# Patient Record
Sex: FEMALE | Race: WHITE | NOT HISPANIC OR LATINO | Employment: UNEMPLOYED | RURAL
[De-identification: names, ages, dates, MRNs, and addresses within clinical notes are randomized per-mention and may not be internally consistent; named-entity substitution may affect disease eponyms.]

---

## 2018-08-07 ENCOUNTER — HISTORICAL (OUTPATIENT)
Dept: ADMINISTRATIVE | Facility: HOSPITAL | Age: 48
End: 2018-08-07

## 2018-08-09 LAB
LAB AP CLINICAL INFORMATION: NORMAL
LAB AP GENERAL CAT - HISTORICAL: NORMAL
LAB AP INTERPRETATION/RESULT - HISTORICAL: NEGATIVE
LAB AP SPECIMEN ADEQUACY - HISTORICAL: NORMAL
LAB AP SPECIMEN SUBMITTED - HISTORICAL: NORMAL

## 2022-07-15 ENCOUNTER — OFFICE VISIT (OUTPATIENT)
Dept: FAMILY MEDICINE | Facility: CLINIC | Age: 52
End: 2022-07-15
Payer: OTHER GOVERNMENT

## 2022-07-15 VITALS
OXYGEN SATURATION: 100 % | DIASTOLIC BLOOD PRESSURE: 89 MMHG | HEIGHT: 65 IN | HEART RATE: 67 BPM | SYSTOLIC BLOOD PRESSURE: 138 MMHG | BODY MASS INDEX: 26.82 KG/M2 | WEIGHT: 161 LBS | TEMPERATURE: 99 F

## 2022-07-15 DIAGNOSIS — H92.03 OTALGIA OF BOTH EARS: Primary | ICD-10-CM

## 2022-07-15 PROCEDURE — 99203 OFFICE O/P NEW LOW 30 MIN: CPT | Mod: ,,, | Performed by: FAMILY MEDICINE

## 2022-07-15 PROCEDURE — 99203 PR OFFICE/OUTPT VISIT, NEW, LEVL III, 30-44 MIN: ICD-10-PCS | Mod: ,,, | Performed by: FAMILY MEDICINE

## 2022-07-15 RX ORDER — AMOXICILLIN AND CLAVULANATE POTASSIUM 875; 125 MG/1; MG/1
1 TABLET, FILM COATED ORAL EVERY 12 HOURS
Qty: 20 TABLET | Refills: 0 | Status: SHIPPED | OUTPATIENT
Start: 2022-07-15 | End: 2022-08-24 | Stop reason: SDUPTHER

## 2022-07-15 RX ORDER — ESCITALOPRAM OXALATE 20 MG/1
1 TABLET ORAL DAILY
COMMUNITY
Start: 2022-05-17 | End: 2022-08-24 | Stop reason: SDUPTHER

## 2022-07-15 RX ORDER — LOSARTAN POTASSIUM 50 MG/1
50 TABLET ORAL
COMMUNITY
End: 2022-08-24 | Stop reason: SDUPTHER

## 2022-07-15 RX ORDER — ROSUVASTATIN CALCIUM 20 MG/1
TABLET, COATED ORAL
COMMUNITY
Start: 2022-06-23 | End: 2022-08-24 | Stop reason: SDUPTHER

## 2022-07-15 RX ORDER — ROSUVASTATIN CALCIUM 20 MG/1
20 TABLET, COATED ORAL NIGHTLY
COMMUNITY
Start: 2022-06-26 | End: 2022-08-24 | Stop reason: SDUPTHER

## 2022-07-15 NOTE — PROGRESS NOTES
Subjective:       Patient ID: Yodit Jimenez is a 51 y.o. female.    Chief Complaint: Otalgia (Patient has bilateral ear pain X 3 weeks. ) and Sinus Problem (Patient has some sinus drainage X 3 weeks )    Patient says her ears were burning and swollen initially.  Now with pain inside her ear.  Some sinus congestion no fever.  No pain with chewing    Review of Systems      Objective:      Physical Exam  Constitutional:       General: She is not in acute distress.     Appearance: Normal appearance. She is not ill-appearing.   HENT:      Right Ear: Tympanic membrane, ear canal and external ear normal.      Left Ear: Tympanic membrane, ear canal and external ear normal.      Nose: Congestion and rhinorrhea present.      Mouth/Throat:      Pharynx: Posterior oropharyngeal erythema present.   Cardiovascular:      Rate and Rhythm: Normal rate and regular rhythm.   Pulmonary:      Effort: Pulmonary effort is normal.      Breath sounds: Normal breath sounds.   Musculoskeletal:         General: No tenderness (No TMJ tenderness).   Skin:     General: Skin is warm and dry.      Findings: No rash.   Neurological:      Mental Status: She is alert.         Assessment:       Problem List Items Addressed This Visit    None         Plan:         no explanation for her ear discomfort found lung exam.  Unusual complaint.  Treat with antibiotics.  She does not tolerate steroids.  If symptoms continue cough or referral to ENT

## 2022-07-23 ENCOUNTER — OFFICE VISIT (OUTPATIENT)
Dept: FAMILY MEDICINE | Facility: CLINIC | Age: 52
End: 2022-07-23
Payer: OTHER GOVERNMENT

## 2022-07-23 VITALS
WEIGHT: 161 LBS | RESPIRATION RATE: 16 BRPM | TEMPERATURE: 99 F | BODY MASS INDEX: 26.82 KG/M2 | SYSTOLIC BLOOD PRESSURE: 107 MMHG | HEART RATE: 62 BPM | HEIGHT: 65 IN | OXYGEN SATURATION: 95 % | DIASTOLIC BLOOD PRESSURE: 75 MMHG

## 2022-07-23 DIAGNOSIS — Z11.52 ENCOUNTER FOR SCREENING FOR COVID-19: Primary | ICD-10-CM

## 2022-07-23 LAB
CTP QC/QA: YES
SARS-COV-2 AG RESP QL IA.RAPID: NEGATIVE

## 2022-07-23 PROCEDURE — 87426 SARSCOV CORONAVIRUS AG IA: CPT | Mod: QW,,, | Performed by: FAMILY MEDICINE

## 2022-07-23 PROCEDURE — 99214 PR OFFICE/OUTPT VISIT, EST, LEVL IV, 30-39 MIN: ICD-10-PCS | Mod: ,,, | Performed by: FAMILY MEDICINE

## 2022-07-23 PROCEDURE — 87426 SARS CORONAVIRUS 2 ANTIGEN POCT: ICD-10-PCS | Mod: QW,,, | Performed by: FAMILY MEDICINE

## 2022-07-23 PROCEDURE — 99214 OFFICE O/P EST MOD 30 MIN: CPT | Mod: ,,, | Performed by: FAMILY MEDICINE

## 2022-07-23 NOTE — PROGRESS NOTES
Subjective:       Patient ID: Yodit Jimenez is a 51 y.o. female.    Chief Complaint: COVID-19 Concerns (Travel reason )    HPI  Review of Systems   Constitutional: Negative for activity change, appetite change, chills, diaphoresis, fatigue, fever and unexpected weight change.   HENT: Negative for nasal congestion, dental problem, drooling, ear discharge, ear pain, facial swelling, hearing loss, mouth sores, nosebleeds, postnasal drip, rhinorrhea, sinus pressure/congestion, sneezing, sore throat, tinnitus, trouble swallowing, voice change and goiter.    Eyes: Negative for photophobia, discharge, itching and visual disturbance.   Respiratory: Negative for apnea, cough, choking, chest tightness, shortness of breath, wheezing and stridor.    Cardiovascular: Negative for chest pain, palpitations, leg swelling and claudication.   Gastrointestinal: Negative for abdominal distention, abdominal pain, anal bleeding, blood in stool, change in bowel habit, constipation, diarrhea, nausea, vomiting and change in bowel habit.   Endocrine: Negative for cold intolerance, heat intolerance, polydipsia, polyphagia and polyuria.   Genitourinary: Negative for bladder incontinence, decreased urine volume, difficulty urinating, dysuria, enuresis, flank pain, frequency, hematuria, nocturia, pelvic pain and urgency.   Musculoskeletal: Negative for arthralgias, back pain, gait problem, joint swelling, leg pain, myalgias, neck pain, neck stiffness and joint deformity.   Integumentary:  Negative for pallor, rash, wound, breast mass and breast tenderness.   Allergic/Immunologic: Negative for environmental allergies, food allergies and immunocompromised state.   Neurological: Negative for dizziness, vertigo, tremors, seizures, syncope, facial asymmetry, speech difficulty, weakness, light-headedness, numbness, headaches, disturbances in coordination, memory loss and coordination difficulties.   Hematological: Negative for adenopathy. Does not  bruise/bleed easily.   Psychiatric/Behavioral: Negative for agitation, behavioral problems, confusion, decreased concentration, dysphoric mood, hallucinations, self-injury, sleep disturbance and suicidal ideas. The patient is not nervous/anxious and is not hyperactive.    Breast: Negative for mass and tenderness        Objective:      Physical Exam  Vitals reviewed.   Constitutional:       Appearance: Normal appearance.   HENT:      Head: Normocephalic and atraumatic.      Right Ear: Tympanic membrane, ear canal and external ear normal.      Left Ear: Tympanic membrane, ear canal and external ear normal.      Nose: Nose normal.      Mouth/Throat:      Mouth: Mucous membranes are moist.      Pharynx: Oropharynx is clear.   Eyes:      Extraocular Movements: Extraocular movements intact.      Conjunctiva/sclera: Conjunctivae normal.      Pupils: Pupils are equal, round, and reactive to light.   Cardiovascular:      Rate and Rhythm: Normal rate and regular rhythm.      Pulses: Normal pulses.      Heart sounds: Normal heart sounds.   Pulmonary:      Effort: Pulmonary effort is normal.      Breath sounds: Normal breath sounds.   Abdominal:      General: Bowel sounds are normal.      Palpations: Abdomen is soft.   Musculoskeletal:         General: Normal range of motion.      Cervical back: Normal range of motion and neck supple.   Skin:     General: Skin is warm and dry.   Neurological:      General: No focal deficit present.      Mental Status: She is alert. Mental status is at baseline.   Psychiatric:         Mood and Affect: Mood normal.         Behavior: Behavior normal.         Thought Content: Thought content normal.         Judgment: Judgment normal.         Assessment:       1. Encounter for screening for COVID-19        Plan:     Encounter for screening for COVID-19  -     SARS Coronavirus 2 Antigen, POCT     Covid negative.

## 2022-08-24 ENCOUNTER — OFFICE VISIT (OUTPATIENT)
Dept: VASCULAR SURGERY | Facility: CLINIC | Age: 52
End: 2022-08-24
Payer: OTHER GOVERNMENT

## 2022-08-24 ENCOUNTER — HOSPITAL ENCOUNTER (OUTPATIENT)
Dept: RADIOLOGY | Facility: HOSPITAL | Age: 52
Discharge: HOME | End: 2022-08-24
Attending: NURSE PRACTITIONER
Payer: OTHER GOVERNMENT

## 2022-08-24 VITALS
BODY MASS INDEX: 26.82 KG/M2 | HEIGHT: 65 IN | WEIGHT: 161 LBS | DIASTOLIC BLOOD PRESSURE: 80 MMHG | RESPIRATION RATE: 20 BRPM | HEART RATE: 88 BPM | SYSTOLIC BLOOD PRESSURE: 135 MMHG

## 2022-08-24 DIAGNOSIS — R60.0 EDEMA OF BOTH LOWER EXTREMITIES: ICD-10-CM

## 2022-08-24 DIAGNOSIS — M79.605 PAIN IN BOTH LOWER EXTREMITIES: Primary | ICD-10-CM

## 2022-08-24 DIAGNOSIS — M79.604 PAIN IN BOTH LOWER EXTREMITIES: Primary | ICD-10-CM

## 2022-08-24 PROCEDURE — 99213 OFFICE O/P EST LOW 20 MIN: CPT | Mod: PBBFAC,25 | Performed by: NURSE PRACTITIONER

## 2022-08-24 PROCEDURE — 93970 EXTREMITY STUDY: CPT | Mod: 26,,, | Performed by: FAMILY MEDICINE

## 2022-08-24 PROCEDURE — 93970 EXTREMITY STUDY: CPT | Mod: TC

## 2022-08-24 PROCEDURE — 99204 OFFICE O/P NEW MOD 45 MIN: CPT | Mod: S$PBB,,, | Performed by: NURSE PRACTITIONER

## 2022-08-24 PROCEDURE — 99204 PR OFFICE/OUTPT VISIT, NEW, LEVL IV, 45-59 MIN: ICD-10-PCS | Mod: S$PBB,,, | Performed by: NURSE PRACTITIONER

## 2022-08-24 PROCEDURE — 93970 US VENOUS REFLUX STUDY BILATERAL: ICD-10-PCS | Mod: 26,,, | Performed by: FAMILY MEDICINE

## 2022-08-24 RX ORDER — CEFUROXIME AXETIL 250 MG/1
TABLET ORAL
COMMUNITY
Start: 2022-06-29

## 2022-08-24 RX ORDER — ROSUVASTATIN CALCIUM 10 MG/1
10 TABLET, COATED ORAL
COMMUNITY
End: 2023-08-07

## 2022-08-24 RX ORDER — OMEPRAZOLE 20 MG/1
20 CAPSULE, DELAYED RELEASE ORAL
COMMUNITY
Start: 2022-05-17

## 2022-08-24 RX ORDER — PROPRANOLOL HYDROCHLORIDE 80 MG/1
80 CAPSULE, EXTENDED RELEASE ORAL DAILY
COMMUNITY
Start: 2022-07-01 | End: 2023-11-10

## 2022-08-24 RX ORDER — ACETAMINOPHEN 325 MG/1
650 TABLET ORAL EVERY 6 HOURS PRN
COMMUNITY
End: 2023-08-07

## 2022-08-24 RX ORDER — LOSARTAN POTASSIUM 100 MG/1
TABLET ORAL
COMMUNITY
Start: 2022-05-18

## 2022-08-24 RX ORDER — IBUPROFEN 200 MG
1 CAPSULE ORAL DAILY
COMMUNITY

## 2022-08-24 RX ORDER — INSULIN GLARGINE 100 [IU]/ML
INJECTION, SOLUTION SUBCUTANEOUS
COMMUNITY
Start: 2022-06-29

## 2022-08-24 RX ORDER — INSULIN ASPART 100 [IU]/ML
INJECTION, SOLUTION INTRAVENOUS; SUBCUTANEOUS
COMMUNITY
Start: 2022-07-01

## 2022-08-24 RX ORDER — ESCITALOPRAM OXALATE 20 MG/1
20 TABLET ORAL
COMMUNITY

## 2022-08-24 NOTE — PROGRESS NOTES
[facility] CLINIC NOTE    Patient ID: Yodit Jimenez is a 51 y.o. female.    I. HISTORY     Chief Complaint:   Chief Complaint   Patient presents with    Varicose Veins     Exam room 2. [facility]        HPI: Yodit Jimenez is a 51 y.o. female who [facility] of varicose veins.  Symptoms are as described in the chart below and began about 2 years ago.  Location is bilateral lower legs. Symptoms are progressive at the end of the day.  History of venous interventions includes None.  No known family history of venous disease.  No open uclers. She denies history of HF or RF.       Venous Disease Medical Necessity Documentation Initial Visit Date:  8/24/2022 Return Check Date:    1. Have you ever had a rupture or bleed from a varicose vein in your leg(s)?              [] Yes  [x] No   [] Yes   [] No   2. Have you ever been diagnosed with phlebitis, cellulitis, or inflammation in the area of the varicose veins of  your leg(s)?  [] Yes  [x] No    [] Yes   [] No   3. Do you have darkened or inflamed skin on your legs?   [] Yes   [x] No   [] Yes   [] No   4. Do you have leg swelling?     [x] Yes   [] No   [] Yes   [] No   5. Do you have leg pain?   [x] Yes   [] No   [] Yes   [] No   If yes, describe the type of pain?    []   Stabbing [x]  Radiating [x]  Aching   [x]  Tightness [x]  Throbbing               []  Burning [x]  Cramping              6. Do you have leg discomfort?   [x] Yes   [] No   [] Yes   [] No   If yes, describe the type of discomfort?    [x]  Heaviness [x]  Fullness   [x]  Restlessness [x] Tired/Fatigued [] Itching              7. Have you ever worn compression hose?   [] Yes   [x] No   [] Yes   [] No   If yes, how long?           8. Do you elevate your legs while sitting?   [x] Yes   [] No   [] Yes   [] No   9. Does venous disease (varicose veins, ulcers, skin changes, leg pain/swelling) interfere with your daily life?  If yes, check activities  you are limited or unable to do.    []  Shower  [x]   Walk  [x]  Exercise  [] Play with children/grandchildren  [x]  Shop [] Work [x] Stand for any period of time [x] Sleep                               [] Sitting for an extended period of time.           [x] Yes   [] No   [] Yes   [] No   10. Do you exercise/have you tried to exercise (i.e.  Walk our participate in a regular exercise routine)?  [x] Yes  [] No   [] Yes   [] No   11. BMI 26.7             Past Medical History:   Diagnosis Date    Diabetes mellitus     GERD (gastroesophageal reflux disease)     Hypertension         Past Surgical History:   Procedure Laterality Date    CLAVICLE SURGERY Right     COLON SURGERY      fracture arm Right     tail bone      TONSILLECTOMY      VAGINAL DELIVERY      x2       Social History     Tobacco Use   Smoking Status Never Smoker   Smokeless Tobacco Never Used         Current Outpatient Medications:     acetaminophen (TYLENOL) 325 MG tablet, Take 650 mg by mouth every 6 (six) hours as needed., Disp: , Rfl:     blood sugar diagnostic Strp, by NOT APPLICABLE route., Disp: , Rfl:     calcium citrate (CALCITRATE) 200 mg (950 mg) tablet, Take 1 tablet by mouth once daily., Disp: , Rfl:     EScitalopram oxalate (LEXAPRO) 20 MG tablet, Take 20 mg by mouth., Disp: , Rfl:     LANTUS U-100 INSULIN 100 unit/mL injection, Inject into the skin., Disp: , Rfl:     losartan (COZAAR) 100 MG tablet, Take by mouth., Disp: , Rfl:     multivitamin (MULTIPLE VITAMIN ORAL), Take by mouth., Disp: , Rfl:     NOVOLOG U-100 INSULIN ASPART 100 unit/mL injection, Inject into the skin., Disp: , Rfl:     omeprazole (PRILOSEC) 20 MG capsule, Take 20 mg by mouth., Disp: , Rfl:     propranoloL (INDERAL LA) 80 MG 24 hr capsule, Take 80 mg by mouth once daily., Disp: , Rfl:     rosuvastatin (CRESTOR) 10 MG tablet, Take 10 mg by mouth., Disp: , Rfl:     semaglutide (OZEMPIC) 1 mg/dose (2 mg/1.5 mL) PnIj, Inject into the skin., Disp: ,  Rfl:     sumatriptan (IMITREX STATDOSE) 6 mg/0.5 mL kit, Inject into the skin., Disp: , Rfl:     Review of Systems   Constitutional: Negative for fatigue and fever.   Eyes: Negative for pain.   Respiratory: Negative for chest tightness and shortness of breath.    Cardiovascular: Positive for leg swelling. Negative for chest pain.   Gastrointestinal: Negative for abdominal pain.   Musculoskeletal: Positive for leg pain.   Neurological: Negative for syncope.   Psychiatric/Behavioral: Negative for sleep disturbance.          II. PHYSICAL EXAM     Physical Exam  Vitals reviewed.   Constitutional:       General: She is not in acute distress.     Appearance: She is normal weight.   HENT:      Head: Normocephalic.   Eyes:      Pupils: Pupils are equal, round, and reactive to light.   Cardiovascular:      Rate and Rhythm: Normal rate and regular rhythm.      Comments: Palpable pulses both feet    Scattered appearance of varicose veins vs. Muscle hernia  Pulmonary:      Effort: Pulmonary effort is normal.   Abdominal:      Palpations: Abdomen is soft.   Musculoskeletal:         General: No swelling. Normal range of motion.      Cervical back: Normal range of motion.      Left lower leg: No edema.      Comments: Scant swelling of BLE's    Skin:     General: Skin is warm and dry.   Neurological:      Mental Status: She is alert and oriented to person, place, and time.         Reticular/Spider veins noted:  RLE: ankle  LLE: ankle    Varicose veins noted:  RLE: none  LLE:  none    CEAP Classification                           Venous Clinical Severity Score     III. ASSESSMENT & PLAN (MEDICAL DECISION MAKING)     1. Edema of both lower extremities    2. Pain in both lower extremities        Assessment/Diagnosis and Plan:  Patient has complaints of leg symptoms consistent with VI and physical exam findings of soft bulging areas that could be varicose vs. Muscle hernias.   Therefore, I will order a bilateral complete venous reflux  study and see the patient back with results. Reflux study NEGATIVE for reflux and Negative for DVT , NO evidence of varicosities or VI. Soft tissue buldge one on the right 4.2 mm x 2.3 mm and on the left 3.2 mm x 3.9 mm. Thorough discussion of test findings with patient and , they verbalized understanding.   FU with PCP.   Negative venous w/u.   Recommend 20-30mmHg compression .     Orders Placed This Encounter    US Venous Reflux Study Bilateral          ALIYAH Amato

## 2023-03-20 ENCOUNTER — OFFICE VISIT (OUTPATIENT)
Dept: FAMILY MEDICINE | Facility: CLINIC | Age: 53
End: 2023-03-20
Payer: OTHER GOVERNMENT

## 2023-03-20 VITALS
SYSTOLIC BLOOD PRESSURE: 120 MMHG | OXYGEN SATURATION: 94 % | RESPIRATION RATE: 18 BRPM | DIASTOLIC BLOOD PRESSURE: 85 MMHG | BODY MASS INDEX: 27.31 KG/M2 | HEART RATE: 79 BPM | TEMPERATURE: 99 F | HEIGHT: 64 IN | WEIGHT: 160 LBS

## 2023-03-20 DIAGNOSIS — N30.00 ACUTE CYSTITIS WITHOUT HEMATURIA: Primary | ICD-10-CM

## 2023-03-20 DIAGNOSIS — R35.0 URINARY FREQUENCY: ICD-10-CM

## 2023-03-20 PROBLEM — K58.9 IRRITABLE BOWEL SYNDROME: Status: ACTIVE | Noted: 2023-03-20

## 2023-03-20 PROBLEM — Z71.89 OTHER SPECIFIED COUNSELING: Status: ACTIVE | Noted: 2019-08-28

## 2023-03-20 PROBLEM — Z71.3 DIETARY COUNSELING AND SURVEILLANCE: Status: ACTIVE | Noted: 2023-03-20

## 2023-03-20 PROBLEM — M25.569 PAIN IN JOINT, LOWER LEG: Status: ACTIVE | Noted: 2023-03-20

## 2023-03-20 PROBLEM — R13.10 DYSPHAGIA, UNSPECIFIED: Status: ACTIVE | Noted: 2019-08-28

## 2023-03-20 PROBLEM — I10 ESSENTIAL HYPERTENSION: Status: ACTIVE | Noted: 2019-08-28

## 2023-03-20 PROBLEM — G43.109 MIGRAINE WITH AURA: Status: ACTIVE | Noted: 2023-03-20

## 2023-03-20 PROBLEM — O99.810 ABNORMAL GLUCOSE TOLERANCE OF MOTHER AFFECTING PREGNANCY, CHILDBIRTH, OR PUERPERIUM: Status: ACTIVE | Noted: 2023-03-20

## 2023-03-20 PROBLEM — Z30.9 CONTRACEPTIVE MANAGEMENT: Status: ACTIVE | Noted: 2023-03-20

## 2023-03-20 PROBLEM — Z12.4 ENCOUNTER FOR SCREENING FOR MALIGNANT NEOPLASM OF CERVIX: Status: ACTIVE | Noted: 2023-03-20

## 2023-03-20 PROBLEM — K21.00 GASTRO-ESOPHAGEAL REFLUX DISEASE WITH ESOPHAGITIS: Status: ACTIVE | Noted: 2019-02-20

## 2023-03-20 PROBLEM — E11.65 TYPE 2 DIABETES MELLITUS WITH HYPERGLYCEMIA: Status: ACTIVE | Noted: 2019-02-20

## 2023-03-20 PROBLEM — E11.9 TYPE 2 DIABETES MELLITUS, WITHOUT LONG-TERM CURRENT USE OF INSULIN: Status: ACTIVE | Noted: 2019-08-28

## 2023-03-20 PROBLEM — Z13.5 SCREENING FOR OTHER EYE CONDITIONS: Status: ACTIVE | Noted: 2023-03-20

## 2023-03-20 PROBLEM — F39 MOOD DISORDER: Status: ACTIVE | Noted: 2019-02-20

## 2023-03-20 PROBLEM — E11.69 MIXED DIABETIC HYPERLIPIDEMIA ASSOCIATED WITH TYPE 2 DIABETES MELLITUS: Status: ACTIVE | Noted: 2023-03-20

## 2023-03-20 PROBLEM — T78.40XA ALLERGY: Status: ACTIVE | Noted: 2023-03-20

## 2023-03-20 PROBLEM — K21.9 GASTROESOPHAGEAL REFLUX DISEASE: Status: ACTIVE | Noted: 2023-03-20

## 2023-03-20 PROBLEM — M54.50 LOW BACK PAIN: Status: ACTIVE | Noted: 2023-03-20

## 2023-03-20 PROBLEM — E55.9 VITAMIN D DEFICIENCY: Status: ACTIVE | Noted: 2023-03-20

## 2023-03-20 PROBLEM — E78.2 MIXED DIABETIC HYPERLIPIDEMIA ASSOCIATED WITH TYPE 2 DIABETES MELLITUS: Status: ACTIVE | Noted: 2023-03-20

## 2023-03-20 PROBLEM — E78.2 MIXED HYPERLIPIDEMIA: Status: ACTIVE | Noted: 2019-02-20

## 2023-03-20 PROBLEM — R69 OTHER ILL-DEFINED AND UNKNOWN CAUSES OF MORBIDITY AND MORTALITY: Status: ACTIVE | Noted: 2023-03-20

## 2023-03-20 PROBLEM — J32.9 CHRONIC SINUSITIS: Status: ACTIVE | Noted: 2023-03-20

## 2023-03-20 PROBLEM — Z71.9 ENCOUNTER FOR COUNSELING: Status: ACTIVE | Noted: 2023-03-20

## 2023-03-20 PROBLEM — Z82.49 FAMILY HISTORY OF ISCHEMIC HEART DISEASE (IHD): Status: ACTIVE | Noted: 2023-03-20

## 2023-03-20 LAB
BILIRUB SERPL-MCNC: ABNORMAL MG/DL
BLOOD URINE, POC: ABNORMAL
COLOR, POC UA: ABNORMAL
GLUCOSE UR QL STRIP: ABNORMAL
KETONES UR QL STRIP: ABNORMAL
LEUKOCYTE ESTERASE URINE, POC: ABNORMAL
NITRITE, POC UA: POSITIVE
PH, POC UA: 6.5
PROTEIN, POC: >=300
SPECIFIC GRAVITY, POC UA: >=1.03
UROBILINOGEN, POC UA: 1

## 2023-03-20 PROCEDURE — 87186 SC STD MICRODIL/AGAR DIL: CPT | Mod: ,,, | Performed by: CLINICAL MEDICAL LABORATORY

## 2023-03-20 PROCEDURE — 81003 POCT URINALYSIS W/O SCOPE: ICD-10-PCS | Mod: QW,,, | Performed by: NURSE PRACTITIONER

## 2023-03-20 PROCEDURE — 87077 CULTURE, URINE: ICD-10-PCS | Mod: ,,, | Performed by: CLINICAL MEDICAL LABORATORY

## 2023-03-20 PROCEDURE — 99213 PR OFFICE/OUTPT VISIT, EST, LEVL III, 20-29 MIN: ICD-10-PCS | Mod: 25,,, | Performed by: NURSE PRACTITIONER

## 2023-03-20 PROCEDURE — 96372 THER/PROPH/DIAG INJ SC/IM: CPT | Mod: ,,, | Performed by: NURSE PRACTITIONER

## 2023-03-20 PROCEDURE — 87086 URINE CULTURE/COLONY COUNT: CPT | Mod: ,,, | Performed by: CLINICAL MEDICAL LABORATORY

## 2023-03-20 PROCEDURE — 87186 CULTURE, URINE: ICD-10-PCS | Mod: ,,, | Performed by: CLINICAL MEDICAL LABORATORY

## 2023-03-20 PROCEDURE — 87086 CULTURE, URINE: ICD-10-PCS | Mod: ,,, | Performed by: CLINICAL MEDICAL LABORATORY

## 2023-03-20 PROCEDURE — 96372 PR INJECTION,THERAP/PROPH/DIAG2ST, IM OR SUBCUT: ICD-10-PCS | Mod: ,,, | Performed by: NURSE PRACTITIONER

## 2023-03-20 PROCEDURE — 99213 OFFICE O/P EST LOW 20 MIN: CPT | Mod: 25,,, | Performed by: NURSE PRACTITIONER

## 2023-03-20 PROCEDURE — 87077 CULTURE AEROBIC IDENTIFY: CPT | Mod: ,,, | Performed by: CLINICAL MEDICAL LABORATORY

## 2023-03-20 PROCEDURE — 81003 URINALYSIS AUTO W/O SCOPE: CPT | Mod: QW,,, | Performed by: NURSE PRACTITIONER

## 2023-03-20 RX ORDER — INSULIN GLARGINE-YFGN 100 [IU]/ML
INJECTION, SOLUTION SUBCUTANEOUS
COMMUNITY
Start: 2023-02-05

## 2023-03-20 RX ORDER — ESCITALOPRAM OXALATE 20 MG/1
1 TABLET ORAL DAILY
COMMUNITY
Start: 2023-03-06 | End: 2023-08-07

## 2023-03-20 RX ORDER — CEFUROXIME AXETIL 500 MG/1
500 TABLET ORAL 2 TIMES DAILY
Qty: 20 TABLET | Refills: 0 | Status: SHIPPED | OUTPATIENT
Start: 2023-03-20 | End: 2023-03-30

## 2023-03-20 RX ORDER — CHOLECALCIFEROL (VITAMIN D3) 25 MCG
25 TABLET ORAL
COMMUNITY
Start: 2023-03-02

## 2023-03-20 RX ORDER — CEFTRIAXONE 1 G/1
1 INJECTION, POWDER, FOR SOLUTION INTRAMUSCULAR; INTRAVENOUS
Status: COMPLETED | OUTPATIENT
Start: 2023-03-20 | End: 2023-03-20

## 2023-03-20 RX ORDER — ROSUVASTATIN CALCIUM 20 MG/1
20 TABLET, COATED ORAL
COMMUNITY
Start: 2022-06-23

## 2023-03-20 RX ORDER — FLUCONAZOLE 150 MG/1
TABLET ORAL
Qty: 2 TABLET | Refills: 0 | Status: SHIPPED | OUTPATIENT
Start: 2023-03-20 | End: 2023-08-07

## 2023-03-20 RX ADMIN — CEFTRIAXONE 1 G: 1 INJECTION, POWDER, FOR SOLUTION INTRAMUSCULAR; INTRAVENOUS at 09:03

## 2023-03-20 NOTE — PROGRESS NOTES
"Subjective:       Patient ID: Yodit Jimenez is a 52 y.o. female.    Chief Complaint: Urinary Frequency (Started 2 days ago.) and Dysuria (Started 2 days ago.)    Presents to clinic as above. No abd pain. No N/V or fever.     Review of Systems   Constitutional: Negative.    Respiratory: Negative.     Cardiovascular: Negative.    Gastrointestinal: Negative.    Genitourinary:  Positive for dysuria, frequency and urgency. Negative for flank pain and hematuria.        Reviewed family, medical, surgical, and social history.    Objective:      /85 (BP Location: Right arm, Patient Position: Sitting, BP Method: Large (Automatic))   Pulse 79   Temp 98.6 °F (37 °C) (Oral)   Resp 18   Ht 5' 4" (1.626 m)   Wt 72.6 kg (160 lb)   SpO2 (!) 94%   BMI 27.46 kg/m²   Physical Exam  Vitals and nursing note reviewed.   Constitutional:       General: She is not in acute distress.     Appearance: Normal appearance. She is not ill-appearing, toxic-appearing or diaphoretic.   Cardiovascular:      Rate and Rhythm: Normal rate and regular rhythm.      Heart sounds: Normal heart sounds.   Pulmonary:      Effort: Pulmonary effort is normal.      Breath sounds: Normal breath sounds.   Abdominal:      General: Abdomen is flat. There is no distension.      Palpations: Abdomen is soft. There is no mass.      Tenderness: There is no abdominal tenderness. There is no right CVA tenderness, left CVA tenderness, guarding or rebound.      Hernia: No hernia is present.   Musculoskeletal:      Cervical back: Normal range of motion and neck supple.   Skin:     General: Skin is warm and dry.      Capillary Refill: Capillary refill takes less than 2 seconds.   Neurological:      Mental Status: She is alert and oriented to person, place, and time.   Psychiatric:         Mood and Affect: Mood normal.         Behavior: Behavior normal.         Thought Content: Thought content normal.         Judgment: Judgment normal.          Office Visit on " 03/20/2023   Component Date Value Ref Range Status    Color, UA 03/20/2023 Brown   Final    Spec Grav UA 03/20/2023 >=1.030   Final    pH, UA 03/20/2023 6.5   Final    WBC, UA 03/20/2023 Small   Final    Nitrite, UA 03/20/2023 Positive   Final    Protein, POC 03/20/2023 >=300   Final    Glucose, UA 03/20/2023 100 mg   Final    Ketones, UA 03/20/2023 Trace   Final    Bilirubin, POC 03/20/2023 Small   Final    Urobilinogen, UA 03/20/2023 1.0   Final    Blood, UA 03/20/2023 Large   Final      Assessment:       1. Acute cystitis without hematuria    2. Urinary frequency          Plan:       Acute cystitis without hematuria  -     cefTRIAXone injection 1 g  -     cefUROXime (CEFTIN) 500 MG tablet; Take 1 tablet (500 mg total) by mouth 2 (two) times daily. for 10 days  Dispense: 20 tablet; Refill: 0    Urinary frequency  -     POCT URINALYSIS W/O SCOPE  -     Urine culture; Future; Expected date: 03/20/2023    Other orders  -     fluconazole (DIFLUCAN) 150 MG Tab; Take 1 po on day one and repeat in 5 days if needed for vaginal itching after antibiotic.  Dispense: 2 tablet; Refill: 0    RTC PRN          Risks, benefits, and side effects were discussed with the patient. All questions were answered to the fullest satisfaction of the patient, and pt verbalized understanding and agreement to treatment plan. Pt was to call with any new or worsening symptoms, or present to the ER.

## 2023-03-22 LAB — UA COMPLETE W REFLEX CULTURE PNL UR: ABNORMAL

## 2023-03-23 RX ORDER — SULFAMETHOXAZOLE AND TRIMETHOPRIM 800; 160 MG/1; MG/1
1 TABLET ORAL 2 TIMES DAILY
Qty: 20 TABLET | Refills: 0 | Status: SHIPPED | OUTPATIENT
Start: 2023-03-23 | End: 2023-04-02

## 2023-03-24 ENCOUNTER — TELEPHONE (OUTPATIENT)
Dept: FAMILY MEDICINE | Facility: CLINIC | Age: 53
End: 2023-03-24
Payer: OTHER GOVERNMENT

## 2023-03-24 NOTE — TELEPHONE ENCOUNTER
Pt notified. Voiced understanding. ----- Message from ALIYAH Brooks sent at 3/23/2023  8:16 AM CDT -----  Notify urinary pathogen resistant to ceftin. Stop ceftin. Start new antibiotic that I sent.

## 2023-06-19 PROBLEM — Z13.5 SCREENING FOR OTHER EYE CONDITIONS: Status: RESOLVED | Noted: 2023-03-20 | Resolved: 2023-06-19

## 2023-08-07 ENCOUNTER — OFFICE VISIT (OUTPATIENT)
Dept: FAMILY MEDICINE | Facility: CLINIC | Age: 53
End: 2023-08-07
Payer: OTHER GOVERNMENT

## 2023-08-07 VITALS
TEMPERATURE: 98 F | RESPIRATION RATE: 18 BRPM | HEIGHT: 65 IN | SYSTOLIC BLOOD PRESSURE: 126 MMHG | HEART RATE: 67 BPM | DIASTOLIC BLOOD PRESSURE: 78 MMHG | WEIGHT: 158.63 LBS | BODY MASS INDEX: 26.43 KG/M2 | OXYGEN SATURATION: 97 %

## 2023-08-07 DIAGNOSIS — H66.003 ACUTE SUPPURATIVE OTITIS MEDIA OF BOTH EARS WITHOUT SPONTANEOUS RUPTURE OF TYMPANIC MEMBRANES, RECURRENCE NOT SPECIFIED: ICD-10-CM

## 2023-08-07 DIAGNOSIS — J32.9 SINUSITIS, UNSPECIFIED CHRONICITY, UNSPECIFIED LOCATION: Primary | ICD-10-CM

## 2023-08-07 PROBLEM — K58.8 OTHER IRRITABLE BOWEL SYNDROME: Status: ACTIVE | Noted: 2023-03-20

## 2023-08-07 PROBLEM — Z86.010 HISTORY OF COLONIC POLYPS: Status: ACTIVE | Noted: 2023-08-07

## 2023-08-07 PROBLEM — R13.19 OTHER DYSPHAGIA: Status: ACTIVE | Noted: 2019-08-28

## 2023-08-07 PROBLEM — Z30.09 ENCOUNTER FOR COUNSELING REGARDING CONTRACEPTION: Status: ACTIVE | Noted: 2023-03-20

## 2023-08-07 PROBLEM — K44.9 HIATAL HERNIA: Status: ACTIVE | Noted: 2023-03-20

## 2023-08-07 PROCEDURE — 99213 OFFICE O/P EST LOW 20 MIN: CPT | Mod: ,,, | Performed by: NURSE PRACTITIONER

## 2023-08-07 PROCEDURE — 99213 PR OFFICE/OUTPT VISIT, EST, LEVL III, 20-29 MIN: ICD-10-PCS | Mod: ,,, | Performed by: NURSE PRACTITIONER

## 2023-08-07 RX ORDER — OLOPATADINE HYDROCHLORIDE 1 MG/ML
SOLUTION/ DROPS OPHTHALMIC
COMMUNITY
Start: 2023-07-11

## 2023-08-07 RX ORDER — DEXTROMETHORPHAN HYDROBROMIDE AND GUAIFENESIN 10; 200 MG/1; MG/1
1 CAPSULE, GELATIN COATED ORAL
Qty: 30 EACH | Refills: 0 | Status: SHIPPED | OUTPATIENT
Start: 2023-08-07 | End: 2023-08-17

## 2023-08-07 RX ORDER — CEFDINIR 300 MG/1
300 CAPSULE ORAL 2 TIMES DAILY
Qty: 20 CAPSULE | Refills: 0 | Status: SHIPPED | OUTPATIENT
Start: 2023-08-07 | End: 2023-08-17

## 2023-08-07 NOTE — PROGRESS NOTES
"Subjective:       Patient ID: Yodit Jimenez is a 52 y.o. female.    Chief Complaint: Otalgia (Right. Ongoing for 3 days.) and Sinus Problem (Drainage down throat. Eyes are watery. Ongoing for 3 days. Would not like to be tested for anything at this time.)    Presents to clinic as above. No fever. Declined covid/flu swabs      Review of Systems   Constitutional: Negative.    HENT:  Positive for congestion, ear pain and sinus pain. Negative for ear discharge and sore throat.    Respiratory: Negative.     Cardiovascular: Negative.    Musculoskeletal: Negative.    Neurological:  Positive for headaches.          Reviewed family, medical, surgical, and social history.    Objective:      /78 (BP Location: Right arm, Patient Position: Sitting, BP Method: Large (Manual))   Pulse 67   Temp 98.1 °F (36.7 °C) (Oral)   Resp 18   Ht 5' 5" (1.651 m)   Wt 71.9 kg (158 lb 9.6 oz)   SpO2 97%   BMI 26.39 kg/m²   Physical Exam  Vitals and nursing note reviewed.   Constitutional:       General: She is not in acute distress.     Appearance: Normal appearance. She is normal weight. She is not ill-appearing or toxic-appearing.   HENT:      Head: Normocephalic and atraumatic.      Right Ear: Hearing, ear canal and external ear normal. Tympanic membrane is erythematous.      Left Ear: Hearing, ear canal and external ear normal. Tympanic membrane is erythematous.      Nose: Nasal tenderness, mucosal edema, congestion and rhinorrhea present. Rhinorrhea is purulent.      Right Turbinates: Enlarged and swollen.      Left Turbinates: Enlarged and swollen.      Right Sinus: Maxillary sinus tenderness and frontal sinus tenderness present.      Left Sinus: Maxillary sinus tenderness and frontal sinus tenderness present.      Mouth/Throat:      Mouth: Mucous membranes are moist.   Neck:      Vascular: No carotid bruit.   Cardiovascular:      Rate and Rhythm: Normal rate and regular rhythm.      Heart sounds: Normal heart sounds. "   Pulmonary:      Effort: Pulmonary effort is normal.      Breath sounds: Normal breath sounds.   Musculoskeletal:      Cervical back: Normal range of motion and neck supple. No rigidity or tenderness.   Lymphadenopathy:      Cervical: No cervical adenopathy.   Skin:     General: Skin is warm and dry.   Neurological:      Mental Status: She is alert.   Psychiatric:         Mood and Affect: Mood normal.         Behavior: Behavior normal.         Thought Content: Thought content normal.         Judgment: Judgment normal.            No visits with results within 1 Day(s) from this visit.   Latest known visit with results is:   Office Visit on 03/20/2023   Component Date Value Ref Range Status    Color, UA 03/20/2023 Brown   Final    Spec Grav UA 03/20/2023 >=1.030   Final    pH, UA 03/20/2023 6.5   Final    WBC, UA 03/20/2023 Small   Final    Nitrite, UA 03/20/2023 Positive   Final    Protein, POC 03/20/2023 >=300   Final    Glucose, UA 03/20/2023 100 mg   Final    Ketones, UA 03/20/2023 Trace   Final    Bilirubin, POC 03/20/2023 Small   Final    Urobilinogen, UA 03/20/2023 1.0   Final    Blood, UA 03/20/2023 Large   Final    Culture, Urine 03/20/2023 >100,000 Staphylococcus saprophyticus (A)   Final      Assessment:       1. Sinusitis, unspecified chronicity, unspecified location    2. Acute suppurative otitis media of both ears without spontaneous rupture of tympanic membranes, recurrence not specified        Plan:       Sinusitis, unspecified chronicity, unspecified location  -     cefdinir (OMNICEF) 300 MG capsule; Take 1 capsule (300 mg total) by mouth 2 (two) times daily. for 10 days  Dispense: 20 capsule; Refill: 0  -     dextromethorphan-guaiFENesin (CORICIDIN HBP CHEST AMY-COUGH)  mg Cap; Take 1 capsule by mouth every 4 to 6 hours as needed (congestion).  Dispense: 30 each; Refill: 0    Acute suppurative otitis media of both ears without spontaneous rupture of tympanic membranes, recurrence not  specified  -     cefdinir (OMNICEF) 300 MG capsule; Take 1 capsule (300 mg total) by mouth 2 (two) times daily. for 10 days  Dispense: 20 capsule; Refill: 0    RTC PRN          Risks, benefits, and side effects were discussed with the patient. All questions were answered to the fullest satisfaction of the patient, and pt verbalized understanding and agreement to treatment plan. Pt was to call with any new or worsening symptoms, or present to the ER.

## 2023-11-10 ENCOUNTER — OFFICE VISIT (OUTPATIENT)
Dept: FAMILY MEDICINE | Facility: CLINIC | Age: 53
End: 2023-11-10
Payer: OTHER GOVERNMENT

## 2023-11-10 VITALS
SYSTOLIC BLOOD PRESSURE: 138 MMHG | OXYGEN SATURATION: 98 % | RESPIRATION RATE: 19 BRPM | HEART RATE: 104 BPM | TEMPERATURE: 99 F | WEIGHT: 153 LBS | BODY MASS INDEX: 25.49 KG/M2 | DIASTOLIC BLOOD PRESSURE: 87 MMHG | HEIGHT: 65 IN

## 2023-11-10 DIAGNOSIS — J10.1 INFLUENZA B: Primary | ICD-10-CM

## 2023-11-10 DIAGNOSIS — J02.9 SORE THROAT: ICD-10-CM

## 2023-11-10 DIAGNOSIS — Z20.828 EXPOSURE TO VIRAL DISEASE: ICD-10-CM

## 2023-11-10 DIAGNOSIS — R05.1 ACUTE COUGH: ICD-10-CM

## 2023-11-10 LAB
CTP QC/QA: YES
FLUAV AG NPH QL: NEGATIVE
FLUBV AG NPH QL: POSITIVE
S PYO RRNA THROAT QL PROBE: NEGATIVE
SARS-COV-2 AG RESP QL IA.RAPID: NEGATIVE

## 2023-11-10 PROCEDURE — 87804 POCT INFLUENZA A/B: ICD-10-PCS | Mod: 59,QW,, | Performed by: NURSE PRACTITIONER

## 2023-11-10 PROCEDURE — 87880 POCT RAPID STREP A: ICD-10-PCS | Mod: QW,,, | Performed by: NURSE PRACTITIONER

## 2023-11-10 PROCEDURE — 99214 OFFICE O/P EST MOD 30 MIN: CPT | Mod: ,,, | Performed by: NURSE PRACTITIONER

## 2023-11-10 PROCEDURE — 99214 PR OFFICE/OUTPT VISIT, EST, LEVL IV, 30-39 MIN: ICD-10-PCS | Mod: ,,, | Performed by: NURSE PRACTITIONER

## 2023-11-10 PROCEDURE — 87880 STREP A ASSAY W/OPTIC: CPT | Mod: QW,,, | Performed by: NURSE PRACTITIONER

## 2023-11-10 PROCEDURE — 87426 SARSCOV CORONAVIRUS AG IA: CPT | Mod: QW,,, | Performed by: NURSE PRACTITIONER

## 2023-11-10 PROCEDURE — 87426 SARS CORONAVIRUS 2 ANTIGEN POCT: ICD-10-PCS | Mod: QW,,, | Performed by: NURSE PRACTITIONER

## 2023-11-10 PROCEDURE — 87804 INFLUENZA ASSAY W/OPTIC: CPT | Mod: 59,QW,, | Performed by: NURSE PRACTITIONER

## 2023-11-10 RX ORDER — PROMETHAZINE HYDROCHLORIDE AND DEXTROMETHORPHAN HYDROBROMIDE 6.25; 15 MG/5ML; MG/5ML
5 SYRUP ORAL EVERY 6 HOURS PRN
Qty: 240 ML | Refills: 0 | Status: SHIPPED | OUTPATIENT
Start: 2023-11-10 | End: 2023-11-20

## 2023-11-10 RX ORDER — OSELTAMIVIR PHOSPHATE 75 MG/1
75 CAPSULE ORAL 2 TIMES DAILY
Qty: 10 CAPSULE | Refills: 0 | Status: SHIPPED | OUTPATIENT
Start: 2023-11-10 | End: 2023-11-15

## 2023-11-10 NOTE — PROGRESS NOTES
Subjective:       Patient ID: Yodit Jimenez is a 53 y.o. female.    Chief Complaint: Fever (102 for about 3 days - OTC Ibuprofen and Tylenol), Cough (X3 days), Generalized Body Aches, Emesis, Nausea, Diarrhea, and Sore Throat    Fever (102 for about 3 days - OTC Ibuprofen and Tylenol), Cough (X3 days), Otalgia, Generalized Body Aches, Emesis, Nausea, Diarrhea, and Sore Throat      Fever   Associated symptoms include congestion, coughing, diarrhea, ear pain, nausea, a sore throat and vomiting. Pertinent negatives include no abdominal pain, chest pain, headaches or rash.   Cough  Associated symptoms include ear pain, a fever and a sore throat. Pertinent negatives include no chest pain, headaches, rash or shortness of breath.   Emesis   Associated symptoms include coughing, diarrhea and a fever. Pertinent negatives include no abdominal pain, chest pain, dizziness or headaches.   Nausea  Associated symptoms include congestion, coughing, a fever, nausea, a sore throat and vomiting. Pertinent negatives include no abdominal pain, change in bowel habit, chest pain, fatigue, headaches, neck pain, rash or weakness.   Diarrhea   Associated symptoms include coughing, a fever and vomiting. Pertinent negatives include no abdominal pain or headaches.   Sore Throat   Associated symptoms include congestion, coughing, diarrhea, ear pain and vomiting. Pertinent negatives include no abdominal pain, headaches, neck pain or shortness of breath.     Review of Systems   Constitutional:  Positive for fever. Negative for appetite change and fatigue.   HENT:  Positive for nasal congestion, ear pain and sore throat.    Eyes:  Negative for pain, discharge and itching.   Respiratory:  Positive for cough. Negative for shortness of breath.    Cardiovascular:  Negative for chest pain and leg swelling.   Gastrointestinal:  Positive for diarrhea, nausea and vomiting. Negative for abdominal pain and change in bowel habit.   Musculoskeletal:   Negative for back pain, gait problem and neck pain.   Integumentary:  Negative for rash and wound.   Allergic/Immunologic: Negative for immunocompromised state.   Neurological:  Negative for dizziness, weakness and headaches.   All other systems reviewed and are negative.        Objective:      Physical Exam  Vitals and nursing note reviewed.   Constitutional:       General: She is not in acute distress.     Appearance: Normal appearance. She is not ill-appearing, toxic-appearing or diaphoretic.   HENT:      Head: Normocephalic.      Right Ear: Ear canal and external ear normal.      Left Ear: Ear canal and external ear normal.      Ears:      Comments: Tms dull     Nose: Congestion and rhinorrhea present.      Mouth/Throat:      Mouth: Mucous membranes are moist.      Pharynx: Posterior oropharyngeal erythema present. No oropharyngeal exudate.   Eyes:      General: No scleral icterus.        Right eye: No discharge.         Left eye: No discharge.      Extraocular Movements: Extraocular movements intact.      Conjunctiva/sclera: Conjunctivae normal.      Pupils: Pupils are equal, round, and reactive to light.   Cardiovascular:      Rate and Rhythm: Normal rate and regular rhythm.      Pulses: Normal pulses.      Heart sounds: Normal heart sounds. No murmur heard.  Pulmonary:      Effort: Pulmonary effort is normal. No respiratory distress.      Breath sounds: Normal breath sounds. No wheezing, rhonchi or rales.   Abdominal:      General: Bowel sounds are normal.      Palpations: Abdomen is soft.      Tenderness: There is no abdominal tenderness. There is no right CVA tenderness, left CVA tenderness, guarding or rebound.   Musculoskeletal:         General: Normal range of motion.      Cervical back: Neck supple. No tenderness.   Lymphadenopathy:      Cervical: No cervical adenopathy.   Skin:     General: Skin is warm and dry.      Capillary Refill: Capillary refill takes less than 2 seconds.      Findings: No rash.    Neurological:      Mental Status: She is alert and oriented to person, place, and time.   Psychiatric:         Mood and Affect: Mood normal.         Behavior: Behavior normal.         Thought Content: Thought content normal.         Judgment: Judgment normal.            Assessment:       1. Influenza B    2. Exposure to viral disease    3. Sore throat    4. Acute cough        Plan:   Influenza B  -     oseltamivir (TAMIFLU) 75 MG capsule; Take 1 capsule (75 mg total) by mouth 2 (two) times daily. for 5 days  Dispense: 10 capsule; Refill: 0    Exposure to viral disease  -     POCT Influenza A/B Rapid Antigen  -     SARS Coronavirus 2 Antigen, POCT    Sore throat  -     POCT rapid strep A    Acute cough  -     promethazine-dextromethorphan (PROMETHAZINE-DM) 6.25-15 mg/5 mL Syrp; Take 5 mLs by mouth every 6 (six) hours as needed (cough).  Dispense: 240 mL; Refill: 0         Risks, benefits, and side effects were discussed with the patient. All questions were answered to the fullest satisfaction of the patient, and pt verbalized understanding and agreement to treatment plan. Pt was to call with any new or worsening symptoms, or present to the ER